# Patient Record
Sex: FEMALE | Race: WHITE | NOT HISPANIC OR LATINO | URBAN - METROPOLITAN AREA
[De-identification: names, ages, dates, MRNs, and addresses within clinical notes are randomized per-mention and may not be internally consistent; named-entity substitution may affect disease eponyms.]

---

## 2017-09-14 ENCOUNTER — EMERGENCY (EMERGENCY)
Facility: HOSPITAL | Age: 52
LOS: 1 days | Discharge: HOME | End: 2017-09-14
Attending: EMERGENCY MEDICINE | Admitting: EMERGENCY MEDICINE
Payer: COMMERCIAL

## 2017-09-14 VITALS
HEART RATE: 75 BPM | RESPIRATION RATE: 18 BRPM | TEMPERATURE: 98 F | SYSTOLIC BLOOD PRESSURE: 118 MMHG | DIASTOLIC BLOOD PRESSURE: 69 MMHG | OXYGEN SATURATION: 95 %

## 2017-09-14 VITALS
OXYGEN SATURATION: 98 % | TEMPERATURE: 98 F | DIASTOLIC BLOOD PRESSURE: 70 MMHG | RESPIRATION RATE: 18 BRPM | HEART RATE: 67 BPM | SYSTOLIC BLOOD PRESSURE: 109 MMHG

## 2017-09-14 DIAGNOSIS — Z98.891 HISTORY OF UTERINE SCAR FROM PREVIOUS SURGERY: Chronic | ICD-10-CM

## 2017-09-14 DIAGNOSIS — Z90.49 ACQUIRED ABSENCE OF OTHER SPECIFIED PARTS OF DIGESTIVE TRACT: Chronic | ICD-10-CM

## 2017-09-14 LAB
ALBUMIN SERPL ELPH-MCNC: 4.5 G/DL — SIGNIFICANT CHANGE UP (ref 3.3–5)
ALP SERPL-CCNC: 45 U/L — SIGNIFICANT CHANGE UP (ref 40–120)
ALT FLD-CCNC: 13 U/L RC — SIGNIFICANT CHANGE UP (ref 10–45)
ANION GAP SERPL CALC-SCNC: 15 MMOL/L — SIGNIFICANT CHANGE UP (ref 5–17)
AST SERPL-CCNC: 16 U/L — SIGNIFICANT CHANGE UP (ref 10–40)
BILIRUB SERPL-MCNC: 0.7 MG/DL — SIGNIFICANT CHANGE UP (ref 0.2–1.2)
BUN SERPL-MCNC: 14 MG/DL — SIGNIFICANT CHANGE UP (ref 7–23)
CALCIUM SERPL-MCNC: 9.6 MG/DL — SIGNIFICANT CHANGE UP (ref 8.4–10.5)
CHLORIDE SERPL-SCNC: 103 MMOL/L — SIGNIFICANT CHANGE UP (ref 96–108)
CO2 SERPL-SCNC: 24 MMOL/L — SIGNIFICANT CHANGE UP (ref 22–31)
CREAT SERPL-MCNC: 0.79 MG/DL — SIGNIFICANT CHANGE UP (ref 0.5–1.3)
GLUCOSE SERPL-MCNC: 98 MG/DL — SIGNIFICANT CHANGE UP (ref 70–99)
HCT VFR BLD CALC: 43.4 % — SIGNIFICANT CHANGE UP (ref 34.5–45)
HGB BLD-MCNC: 14.5 G/DL — SIGNIFICANT CHANGE UP (ref 11.5–15.5)
MCHC RBC-ENTMCNC: 30.7 PG — SIGNIFICANT CHANGE UP (ref 27–34)
MCHC RBC-ENTMCNC: 33.5 GM/DL — SIGNIFICANT CHANGE UP (ref 32–36)
MCV RBC AUTO: 91.5 FL — SIGNIFICANT CHANGE UP (ref 80–100)
PLATELET # BLD AUTO: 200 K/UL — SIGNIFICANT CHANGE UP (ref 150–400)
POTASSIUM SERPL-MCNC: 4 MMOL/L — SIGNIFICANT CHANGE UP (ref 3.5–5.3)
POTASSIUM SERPL-SCNC: 4 MMOL/L — SIGNIFICANT CHANGE UP (ref 3.5–5.3)
PROT SERPL-MCNC: 7.4 G/DL — SIGNIFICANT CHANGE UP (ref 6–8.3)
RBC # BLD: 4.74 M/UL — SIGNIFICANT CHANGE UP (ref 3.8–5.2)
RBC # FLD: 11.9 % — SIGNIFICANT CHANGE UP (ref 10.3–14.5)
SODIUM SERPL-SCNC: 142 MMOL/L — SIGNIFICANT CHANGE UP (ref 135–145)
WBC # BLD: 8.2 K/UL — SIGNIFICANT CHANGE UP (ref 3.8–10.5)
WBC # FLD AUTO: 8.2 K/UL — SIGNIFICANT CHANGE UP (ref 3.8–10.5)

## 2017-09-14 PROCEDURE — 99284 EMERGENCY DEPT VISIT MOD MDM: CPT

## 2017-09-14 RX ORDER — KETOROLAC TROMETHAMINE 30 MG/ML
15 SYRINGE (ML) INJECTION ONCE
Qty: 0 | Refills: 0 | Status: DISCONTINUED | OUTPATIENT
Start: 2017-09-14 | End: 2017-09-14

## 2017-09-14 RX ORDER — ACETAMINOPHEN 500 MG
1000 TABLET ORAL ONCE
Qty: 0 | Refills: 0 | Status: COMPLETED | OUTPATIENT
Start: 2017-09-14 | End: 2017-09-14

## 2017-09-14 RX ORDER — KETOROLAC TROMETHAMINE 30 MG/ML
30 SYRINGE (ML) INJECTION ONCE
Qty: 0 | Refills: 0 | Status: DISCONTINUED | OUTPATIENT
Start: 2017-09-14 | End: 2017-09-14

## 2017-09-14 RX ORDER — SODIUM CHLORIDE 9 MG/ML
1000 INJECTION INTRAMUSCULAR; INTRAVENOUS; SUBCUTANEOUS ONCE
Qty: 0 | Refills: 0 | Status: COMPLETED | OUTPATIENT
Start: 2017-09-14 | End: 2017-09-14

## 2017-09-14 RX ORDER — METOCLOPRAMIDE HCL 10 MG
10 TABLET ORAL ONCE
Qty: 0 | Refills: 0 | Status: COMPLETED | OUTPATIENT
Start: 2017-09-14 | End: 2017-09-14

## 2017-09-14 RX ADMIN — Medication 15 MILLIGRAM(S): at 22:34

## 2017-09-14 RX ADMIN — Medication 10 MILLIGRAM(S): at 22:34

## 2017-09-14 RX ADMIN — Medication 15 MILLIGRAM(S): at 23:27

## 2017-09-14 RX ADMIN — Medication 400 MILLIGRAM(S): at 23:31

## 2017-09-14 RX ADMIN — SODIUM CHLORIDE 1000 MILLILITER(S): 9 INJECTION INTRAMUSCULAR; INTRAVENOUS; SUBCUTANEOUS at 22:34

## 2017-09-14 NOTE — ED PROVIDER NOTE - OBJECTIVE STATEMENT
52 y.o. female hx of migraines p/w headache for 2 days. Patient states that this does feel similar to past migraines but is more severe. There is associated nausea and she has vomited 10-15 times. Takes elitriptan for migraines but this has not helped pain. Pain is mostly located on the left side of the head with associated photophobia and phonophobia. Denies vision changes, weakness.

## 2017-09-14 NOTE — ED PROVIDER NOTE - ATTENDING CONTRIBUTION TO CARE
52 y.o. female hx of migraines p/w headache started today l sided with multiple bouts of n/v. no uri sts, fevers. pt took her relapax without relief and rested all day without improvement. pt has had a prior mri brain which was nl. pt given migraine cocktail to reassess.

## 2017-09-14 NOTE — ED PROVIDER NOTE - CARE PLAN
Principal Discharge DX:	Migraine  Instructions for follow-up, activity and diet:	1. Continue all home medications as previously prescribed.   2. Follow up with your Primary Care Physician as soon as possible for further evaluation.   3. Return to the Emergency Department for any concerning symptoms.

## 2017-09-14 NOTE — ED PROVIDER NOTE - PLAN OF CARE
1. Continue all home medications as previously prescribed.   2. Follow up with your Primary Care Physician as soon as possible for further evaluation.   3. Return to the Emergency Department for any concerning symptoms.

## 2017-09-15 PROCEDURE — 99284 EMERGENCY DEPT VISIT MOD MDM: CPT | Mod: 25

## 2017-09-15 PROCEDURE — 96374 THER/PROPH/DIAG INJ IV PUSH: CPT

## 2017-09-15 PROCEDURE — 85027 COMPLETE CBC AUTOMATED: CPT

## 2017-09-15 PROCEDURE — 80053 COMPREHEN METABOLIC PANEL: CPT

## 2017-09-15 PROCEDURE — 96375 TX/PRO/DX INJ NEW DRUG ADDON: CPT

## 2017-09-15 RX ORDER — ONDANSETRON 8 MG/1
4 TABLET, FILM COATED ORAL ONCE
Qty: 0 | Refills: 0 | Status: COMPLETED | OUTPATIENT
Start: 2017-09-15 | End: 2017-09-15

## 2017-09-15 RX ADMIN — ONDANSETRON 4 MILLIGRAM(S): 8 TABLET, FILM COATED ORAL at 00:12

## 2017-09-15 RX ADMIN — Medication 1000 MILLIGRAM(S): at 00:11

## 2017-09-27 ENCOUNTER — EMERGENCY (EMERGENCY)
Facility: HOSPITAL | Age: 52
LOS: 1 days | Discharge: HOME | End: 2017-09-27
Attending: PERSONAL EMERGENCY RESPONSE ATTENDANT | Admitting: PERSONAL EMERGENCY RESPONSE ATTENDANT
Payer: COMMERCIAL

## 2017-09-27 VITALS
DIASTOLIC BLOOD PRESSURE: 74 MMHG | RESPIRATION RATE: 16 BRPM | WEIGHT: 156.09 LBS | TEMPERATURE: 37 F | OXYGEN SATURATION: 96 % | SYSTOLIC BLOOD PRESSURE: 111 MMHG | HEART RATE: 87 BPM

## 2017-09-27 DIAGNOSIS — Z90.49 ACQUIRED ABSENCE OF OTHER SPECIFIED PARTS OF DIGESTIVE TRACT: Chronic | ICD-10-CM

## 2017-09-27 DIAGNOSIS — Z98.891 HISTORY OF UTERINE SCAR FROM PREVIOUS SURGERY: Chronic | ICD-10-CM

## 2017-09-27 LAB
ALBUMIN SERPL ELPH-MCNC: 4.2 G/DL — SIGNIFICANT CHANGE UP (ref 3.3–5)
ALP SERPL-CCNC: 42 U/L — SIGNIFICANT CHANGE UP (ref 40–120)
ALT FLD-CCNC: 13 U/L RC — SIGNIFICANT CHANGE UP (ref 10–45)
ANION GAP SERPL CALC-SCNC: 11 MMOL/L — SIGNIFICANT CHANGE UP (ref 5–17)
APPEARANCE UR: CLEAR — SIGNIFICANT CHANGE UP
APTT BLD: 30.8 SEC — SIGNIFICANT CHANGE UP (ref 27.5–37.4)
AST SERPL-CCNC: 15 U/L — SIGNIFICANT CHANGE UP (ref 10–40)
BILIRUB SERPL-MCNC: 0.5 MG/DL — SIGNIFICANT CHANGE UP (ref 0.2–1.2)
BILIRUB UR-MCNC: NEGATIVE — SIGNIFICANT CHANGE UP
BUN SERPL-MCNC: 11 MG/DL — SIGNIFICANT CHANGE UP (ref 7–23)
CALCIUM SERPL-MCNC: 9.3 MG/DL — SIGNIFICANT CHANGE UP (ref 8.4–10.5)
CHLORIDE SERPL-SCNC: 104 MMOL/L — SIGNIFICANT CHANGE UP (ref 96–108)
CO2 SERPL-SCNC: 27 MMOL/L — SIGNIFICANT CHANGE UP (ref 22–31)
COLOR SPEC: COLORLESS — SIGNIFICANT CHANGE UP
CREAT SERPL-MCNC: 0.83 MG/DL — SIGNIFICANT CHANGE UP (ref 0.5–1.3)
DIFF PNL FLD: NEGATIVE — SIGNIFICANT CHANGE UP
GLUCOSE SERPL-MCNC: 96 MG/DL — SIGNIFICANT CHANGE UP (ref 70–99)
GLUCOSE UR QL: NEGATIVE — SIGNIFICANT CHANGE UP
HCT VFR BLD CALC: 41.9 % — SIGNIFICANT CHANGE UP (ref 34.5–45)
HGB BLD-MCNC: 14.1 G/DL — SIGNIFICANT CHANGE UP (ref 11.5–15.5)
INR BLD: 1.25 RATIO — HIGH (ref 0.88–1.16)
KETONES UR-MCNC: NEGATIVE — SIGNIFICANT CHANGE UP
LEUKOCYTE ESTERASE UR-ACNC: NEGATIVE — SIGNIFICANT CHANGE UP
LIDOCAIN IGE QN: 29 U/L — SIGNIFICANT CHANGE UP (ref 7–60)
MCHC RBC-ENTMCNC: 31 PG — SIGNIFICANT CHANGE UP (ref 27–34)
MCHC RBC-ENTMCNC: 33.7 GM/DL — SIGNIFICANT CHANGE UP (ref 32–36)
MCV RBC AUTO: 91.8 FL — SIGNIFICANT CHANGE UP (ref 80–100)
NITRITE UR-MCNC: NEGATIVE — SIGNIFICANT CHANGE UP
PH UR: 6.5 — SIGNIFICANT CHANGE UP (ref 5–8)
PLATELET # BLD AUTO: 140 K/UL — LOW (ref 150–400)
POTASSIUM SERPL-MCNC: 4.1 MMOL/L — SIGNIFICANT CHANGE UP (ref 3.5–5.3)
POTASSIUM SERPL-SCNC: 4.1 MMOL/L — SIGNIFICANT CHANGE UP (ref 3.5–5.3)
PROT SERPL-MCNC: 7.1 G/DL — SIGNIFICANT CHANGE UP (ref 6–8.3)
PROT UR-MCNC: NEGATIVE — SIGNIFICANT CHANGE UP
PROTHROM AB SERPL-ACNC: 13.6 SEC — HIGH (ref 9.8–12.7)
RBC # BLD: 4.56 M/UL — SIGNIFICANT CHANGE UP (ref 3.8–5.2)
RBC # FLD: 11.8 % — SIGNIFICANT CHANGE UP (ref 10.3–14.5)
SODIUM SERPL-SCNC: 142 MMOL/L — SIGNIFICANT CHANGE UP (ref 135–145)
SP GR SPEC: 1.01 — LOW (ref 1.01–1.02)
UROBILINOGEN FLD QL: NEGATIVE — SIGNIFICANT CHANGE UP
WBC # BLD: 6 K/UL — SIGNIFICANT CHANGE UP (ref 3.8–10.5)
WBC # FLD AUTO: 6 K/UL — SIGNIFICANT CHANGE UP (ref 3.8–10.5)

## 2017-09-27 PROCEDURE — 99285 EMERGENCY DEPT VISIT HI MDM: CPT

## 2017-09-27 RX ORDER — POLYETHYLENE GLYCOL 3350 17 G/17G
0 POWDER, FOR SOLUTION ORAL
Qty: 0 | Refills: 0 | COMMUNITY

## 2017-09-27 RX ORDER — ACETAMINOPHEN 500 MG
1000 TABLET ORAL ONCE
Qty: 0 | Refills: 0 | Status: COMPLETED | OUTPATIENT
Start: 2017-09-27 | End: 2017-09-27

## 2017-09-27 RX ORDER — CETIRIZINE HYDROCHLORIDE 10 MG/1
0 TABLET ORAL
Qty: 0 | Refills: 0 | COMMUNITY

## 2017-09-27 RX ORDER — ELETRIPTAN HYDROBROMIDE 20 MG/1
0 TABLET, FILM COATED ORAL
Qty: 0 | Refills: 0 | COMMUNITY

## 2017-09-27 RX ADMIN — Medication 400 MILLIGRAM(S): at 23:28

## 2017-09-27 NOTE — ED ADULT NURSE NOTE - OBJECTIVE STATEMENT
Received patient awake and alert x 4, Presents with abdominal pain, pain which started on 12AM last night. Per patient, pain was constant at first and now it comes and goes with painful spasms. Denies nausea/vomiting. Last bowel movement was this morning. Patient was seen at [facility] where CT of abdomen was preformed and showed small bowel Intussusception. Patient denies any fever/chills. Breathing unlabored with no S/S of distress noted, MD at bedside to further evaluate, family at bedside, safety maintained, will continue to monitor.

## 2017-09-27 NOTE — ED PROVIDER NOTE - ATTENDING CONTRIBUTION TO CARE
Attending MD Titus.  Agree with above.  Pt is a 52 yr old female with hx of migraines only who awoke last night 2/2 severe abdominal pain causing nausea but not vomiting.  Pain worse with lying flat and better when she curls into a ball.  Pt sent for CT abd/pelvis by PCP as outpt.  Pt found to have 'intussuception'.  Pt refusing pain meds on arrival but remains in persistent 5/10 pain.  Outpt images uploaded and surgery consulted on arrival.  Hx appendectomy remotely, 3 c-sections.  No known colon CA risk factors.  Mild free fluid in pelvis. Pt tender on exam concerning for persistent intussuception.  Labs non-actionable.  Surgery consulted and consult pending at time of signout to incoming team.

## 2017-09-27 NOTE — ED PROVIDER NOTE - OBJECTIVE STATEMENT
53 y/o F pmhx migraines, sgx h/o appendectomy remotely and 3 c/s, presenting sent in by primary care physician after outpatient ct scan for abdominal pain. Pt reports severe abdominal pain sharp 10/10 woke her from sleep last night causing nausea. Pt reports pain is worse when she lays flat, gets better when she curls up in a ball. States this morning feeling significantly nauseated but denies any vomiting. Reports she went to the partner of her PMD this morning who sent her to [facility] imaging facility for CT a/p. States she was on her way home from the ct scan and received a call from the doctor telling her to go to the ER as the ct shows intussusception. She reports abdominal pain is 6/10 at this time but reports she does not want anything for pain at this time. Denies any nausea. Reports normal bowel movements.

## 2017-09-27 NOTE — ED PROVIDER NOTE - MEDICAL DECISION MAKING DETAILS
51 y/o F pmhx migraines p/w abdominal pain worse when laying flat better when curled in a ball s/p outpatient ct a/p positive for intussusception sent in by pmd. pe remarkable for mild abdominal tenderness periumbilical. will obtain preop labs, pain control prn, surgery c/s and reassess.

## 2017-09-27 NOTE — ED PROVIDER NOTE - PROGRESS NOTE DETAILS
pt seen by surgery and repeat ct normal, ok for d.c home. tolerating po for f/u with pmd.   anastasiia johnson

## 2017-09-28 VITALS
SYSTOLIC BLOOD PRESSURE: 105 MMHG | HEART RATE: 81 BPM | DIASTOLIC BLOOD PRESSURE: 58 MMHG | RESPIRATION RATE: 18 BRPM | OXYGEN SATURATION: 99 %

## 2017-09-28 LAB
CULTURE RESULTS: NO GROWTH — SIGNIFICANT CHANGE UP
SPECIMEN SOURCE: SIGNIFICANT CHANGE UP

## 2017-09-28 PROCEDURE — 80053 COMPREHEN METABOLIC PANEL: CPT

## 2017-09-28 PROCEDURE — 83605 ASSAY OF LACTIC ACID: CPT

## 2017-09-28 PROCEDURE — 96374 THER/PROPH/DIAG INJ IV PUSH: CPT | Mod: XU

## 2017-09-28 PROCEDURE — 85730 THROMBOPLASTIN TIME PARTIAL: CPT

## 2017-09-28 PROCEDURE — 74177 CT ABD & PELVIS W/CONTRAST: CPT

## 2017-09-28 PROCEDURE — 99284 EMERGENCY DEPT VISIT MOD MDM: CPT | Mod: 25

## 2017-09-28 PROCEDURE — 83690 ASSAY OF LIPASE: CPT

## 2017-09-28 PROCEDURE — 74177 CT ABD & PELVIS W/CONTRAST: CPT | Mod: 26

## 2017-09-28 PROCEDURE — 85610 PROTHROMBIN TIME: CPT

## 2017-09-28 PROCEDURE — 85027 COMPLETE CBC AUTOMATED: CPT

## 2017-09-28 PROCEDURE — 81003 URINALYSIS AUTO W/O SCOPE: CPT

## 2017-09-28 PROCEDURE — 87086 URINE CULTURE/COLONY COUNT: CPT

## 2017-09-28 NOTE — CONSULT NOTE ADULT - SUBJECTIVE AND OBJECTIVE BOX
CC: Patient is a 52y old  Female who presents with a chief complaint of abdominal pain.    HPI: Patient is a 52y year old female with PMHx of migraines, sgx h/o appendectomy remotely and 3 c/s, presenting sent in by primary care physician after outpatient ct scan for abdominal pain. Pt reports severe abdominal pain sharp 10/10 woke her from sleep last night causing nausea. Pt reports pain is worse when she lays flat, gets better when she curls up in a ball. States this morning feeling significantly nauseated but denies any vomiting. Reports she went to the partner of her PMD this morning who sent her to [facility] imaging facility for CT a/p. States she was on her way home from the ct scan and received a call from the doctor telling her to go to the ER as the ct shows intussusception. She reports abdominal pain is 6/10 at this time but reports she does not want anything for pain at this time. Denies any nausea. Reports normal bowel movements.    PMH  Migraine  No pertinent past medical history    PSH  H/O:   History of appendectomy    MEDS        MiraLax: Last Dose Taken:     · 	ZyrTEC: Last Dose Taken:             ·    Relpax: Last Dose Taken:    Allergies    No Known Allergies    Social: Non smoker, Non drinker.      Physical Exam  T(C): 37.2 (17 @ 02:32), Max: 37.2 (17 @ 02:32)  HR: 81 (17 @ 06:30) (77 - 87)  BP: 105/58 (17 @ 06:30) (100/57 - 119/61)  RR: 18 (17 @ 06:30) (16 - 20)  SpO2: 99% (17 @ 06:30) (96% - 100%)  Tmax: T(C): , Max: 37.2 (17 @ 02:32)    Gen: NAD, alert and interactive, oriented.  HEENT: normocephalic, atraumatic, no scleral icterus  CV: S1, S2, RRR  Pulm: CTA B/L  Abd: Soft, ND, mild periumbilical tenderness, no rebound, no guarding, no palpable organomegaly/masses  Ext: warm, no edema, palp dp/pt    Labs:                        14.1   6.0   )-----------( 140      ( 27 Sep 2017 20:48 )             41.9         142  |  104  |  11  ----------------------------<  96  4.1   |  27  |  0.83    Ca    9.3      27 Sep 2017 20:48    TPro  7.1  /  Alb  4.2  /  TBili  0.5  /  DBili  x   /  AST  15  /  ALT  13  /  AlkPhos  42      PT/INR - ( 27 Sep 2017 20:48 )   PT: 13.6 sec;   INR: 1.25 ratio         PTT - ( 27 Sep 2017 20:48 )  PTT:30.8 sec  Urinalysis Basic - ( 27 Sep 2017 20:48 )    Color: x / Appearance: Clear / S.009 / pH: x  Gluc: x / Ketone: Negative  / Bili: Negative / Urobili: Negative   Blood: x / Protein: Negative / Nitrite: Negative   Leuk Esterase: Negative / RBC: x / WBC x   Sq Epi: x / Non Sq Epi: x / Bacteria: x    Imaging    CT IMPRESSION:     Mild edema of the small bowel mesentery and small amount of pelvic free   fluid, nonspecific. This may be related to sequela of recent   intussusception, but outside imaging studies not available for direct   comparison. No intussusception identified on the current exam.

## 2017-09-28 NOTE — CONSULT NOTE ADULT - ASSESSMENT
52y year old Female who presents with abdominal pain, OSH CT showed jejunum interssusception about 3 cm in length.   - Patient has much less pain now  - Repeat CT scan showed disappearance of intussusception,  - Patient is able to tolerate oral intake  - would recommend GI follow up to r/o possibility of tumor.   - Patient is voluntarily discharged, all questions answered, patient agreed with the plan.  - d/w Dr. Hanson.

## 2018-01-19 ENCOUNTER — OUTPATIENT (OUTPATIENT)
Dept: OUTPATIENT SERVICES | Facility: HOSPITAL | Age: 53
LOS: 1 days | End: 2018-01-19

## 2018-01-19 ENCOUNTER — APPOINTMENT (OUTPATIENT)
Dept: NUCLEAR MEDICINE | Facility: HOSPITAL | Age: 53
End: 2018-01-19
Payer: COMMERCIAL

## 2018-01-19 DIAGNOSIS — R68.81 EARLY SATIETY: ICD-10-CM

## 2018-01-19 DIAGNOSIS — Z90.49 ACQUIRED ABSENCE OF OTHER SPECIFIED PARTS OF DIGESTIVE TRACT: Chronic | ICD-10-CM

## 2018-01-19 DIAGNOSIS — Z98.891 HISTORY OF UTERINE SCAR FROM PREVIOUS SURGERY: Chronic | ICD-10-CM

## 2018-01-19 PROCEDURE — 78264 GASTRIC EMPTYING IMG STUDY: CPT | Mod: 26

## 2019-12-08 ENCOUNTER — EMERGENCY (EMERGENCY)
Facility: HOSPITAL | Age: 54
LOS: 1 days | Discharge: HOME | End: 2019-12-08
Attending: STUDENT IN AN ORGANIZED HEALTH CARE EDUCATION/TRAINING PROGRAM
Payer: COMMERCIAL

## 2019-12-08 VITALS
OXYGEN SATURATION: 100 % | SYSTOLIC BLOOD PRESSURE: 120 MMHG | RESPIRATION RATE: 18 BRPM | TEMPERATURE: 99 F | DIASTOLIC BLOOD PRESSURE: 68 MMHG | HEART RATE: 67 BPM

## 2019-12-08 VITALS
WEIGHT: 149.03 LBS | HEART RATE: 69 BPM | SYSTOLIC BLOOD PRESSURE: 115 MMHG | RESPIRATION RATE: 17 BRPM | DIASTOLIC BLOOD PRESSURE: 76 MMHG | OXYGEN SATURATION: 98 % | TEMPERATURE: 98 F | HEIGHT: 68 IN

## 2019-12-08 DIAGNOSIS — Z98.891 HISTORY OF UTERINE SCAR FROM PREVIOUS SURGERY: Chronic | ICD-10-CM

## 2019-12-08 DIAGNOSIS — Z90.49 ACQUIRED ABSENCE OF OTHER SPECIFIED PARTS OF DIGESTIVE TRACT: Chronic | ICD-10-CM

## 2019-12-08 LAB
ALBUMIN SERPL ELPH-MCNC: 4.2 G/DL — SIGNIFICANT CHANGE UP (ref 3.3–5)
ALP SERPL-CCNC: 63 U/L — SIGNIFICANT CHANGE UP (ref 40–120)
ALT FLD-CCNC: 15 U/L — SIGNIFICANT CHANGE UP (ref 10–45)
ANION GAP SERPL CALC-SCNC: 11 MMOL/L — SIGNIFICANT CHANGE UP (ref 5–17)
AST SERPL-CCNC: 13 U/L — SIGNIFICANT CHANGE UP (ref 10–40)
BASOPHILS # BLD AUTO: 0.03 K/UL — SIGNIFICANT CHANGE UP (ref 0–0.2)
BASOPHILS NFR BLD AUTO: 0.5 % — SIGNIFICANT CHANGE UP (ref 0–2)
BILIRUB SERPL-MCNC: 0.2 MG/DL — SIGNIFICANT CHANGE UP (ref 0.2–1.2)
BUN SERPL-MCNC: 18 MG/DL — SIGNIFICANT CHANGE UP (ref 7–23)
CALCIUM SERPL-MCNC: 9.6 MG/DL — SIGNIFICANT CHANGE UP (ref 8.4–10.5)
CHLORIDE SERPL-SCNC: 105 MMOL/L — SIGNIFICANT CHANGE UP (ref 96–108)
CO2 SERPL-SCNC: 26 MMOL/L — SIGNIFICANT CHANGE UP (ref 22–31)
CREAT SERPL-MCNC: 0.79 MG/DL — SIGNIFICANT CHANGE UP (ref 0.5–1.3)
EOSINOPHIL # BLD AUTO: 0.08 K/UL — SIGNIFICANT CHANGE UP (ref 0–0.5)
EOSINOPHIL NFR BLD AUTO: 1.4 % — SIGNIFICANT CHANGE UP (ref 0–6)
GLUCOSE SERPL-MCNC: 98 MG/DL — SIGNIFICANT CHANGE UP (ref 70–99)
HCT VFR BLD CALC: 39.6 % — SIGNIFICANT CHANGE UP (ref 34.5–45)
HGB BLD-MCNC: 13.4 G/DL — SIGNIFICANT CHANGE UP (ref 11.5–15.5)
IMM GRANULOCYTES NFR BLD AUTO: 0.2 % — SIGNIFICANT CHANGE UP (ref 0–1.5)
LYMPHOCYTES # BLD AUTO: 1.76 K/UL — SIGNIFICANT CHANGE UP (ref 1–3.3)
LYMPHOCYTES # BLD AUTO: 30.9 % — SIGNIFICANT CHANGE UP (ref 13–44)
MAGNESIUM SERPL-MCNC: 2 MG/DL — SIGNIFICANT CHANGE UP (ref 1.6–2.6)
MCHC RBC-ENTMCNC: 29.7 PG — SIGNIFICANT CHANGE UP (ref 27–34)
MCHC RBC-ENTMCNC: 33.8 GM/DL — SIGNIFICANT CHANGE UP (ref 32–36)
MCV RBC AUTO: 87.8 FL — SIGNIFICANT CHANGE UP (ref 80–100)
MONOCYTES # BLD AUTO: 0.49 K/UL — SIGNIFICANT CHANGE UP (ref 0–0.9)
MONOCYTES NFR BLD AUTO: 8.6 % — SIGNIFICANT CHANGE UP (ref 2–14)
NEUTROPHILS # BLD AUTO: 3.33 K/UL — SIGNIFICANT CHANGE UP (ref 1.8–7.4)
NEUTROPHILS NFR BLD AUTO: 58.4 % — SIGNIFICANT CHANGE UP (ref 43–77)
NRBC # BLD: 0 /100 WBCS — SIGNIFICANT CHANGE UP (ref 0–0)
PHOSPHATE SERPL-MCNC: 3.5 MG/DL — SIGNIFICANT CHANGE UP (ref 2.5–4.5)
PLATELET # BLD AUTO: 185 K/UL — SIGNIFICANT CHANGE UP (ref 150–400)
POTASSIUM SERPL-MCNC: 3.7 MMOL/L — SIGNIFICANT CHANGE UP (ref 3.5–5.3)
POTASSIUM SERPL-SCNC: 3.7 MMOL/L — SIGNIFICANT CHANGE UP (ref 3.5–5.3)
PROT SERPL-MCNC: 7.1 G/DL — SIGNIFICANT CHANGE UP (ref 6–8.3)
RBC # BLD: 4.51 M/UL — SIGNIFICANT CHANGE UP (ref 3.8–5.2)
RBC # FLD: 12.8 % — SIGNIFICANT CHANGE UP (ref 10.3–14.5)
SODIUM SERPL-SCNC: 142 MMOL/L — SIGNIFICANT CHANGE UP (ref 135–145)
WBC # BLD: 5.7 K/UL — SIGNIFICANT CHANGE UP (ref 3.8–10.5)
WBC # FLD AUTO: 5.7 K/UL — SIGNIFICANT CHANGE UP (ref 3.8–10.5)

## 2019-12-08 PROCEDURE — 80053 COMPREHEN METABOLIC PANEL: CPT

## 2019-12-08 PROCEDURE — 84100 ASSAY OF PHOSPHORUS: CPT

## 2019-12-08 PROCEDURE — 93005 ELECTROCARDIOGRAM TRACING: CPT

## 2019-12-08 PROCEDURE — 83735 ASSAY OF MAGNESIUM: CPT

## 2019-12-08 PROCEDURE — 99284 EMERGENCY DEPT VISIT MOD MDM: CPT

## 2019-12-08 PROCEDURE — 84484 ASSAY OF TROPONIN QUANT: CPT

## 2019-12-08 PROCEDURE — 85027 COMPLETE CBC AUTOMATED: CPT

## 2019-12-08 PROCEDURE — 99284 EMERGENCY DEPT VISIT MOD MDM: CPT | Mod: 25

## 2019-12-08 PROCEDURE — 93010 ELECTROCARDIOGRAM REPORT: CPT

## 2019-12-08 RX ORDER — SODIUM CHLORIDE 9 MG/ML
1000 INJECTION INTRAMUSCULAR; INTRAVENOUS; SUBCUTANEOUS ONCE
Refills: 0 | Status: COMPLETED | OUTPATIENT
Start: 2019-12-08 | End: 2019-12-08

## 2019-12-08 RX ADMIN — SODIUM CHLORIDE 1000 MILLILITER(S): 9 INJECTION INTRAMUSCULAR; INTRAVENOUS; SUBCUTANEOUS at 21:29

## 2019-12-08 NOTE — ED PROVIDER NOTE - ATTENDING CONTRIBUTION TO CARE
54 F w/ no significant pmh presents to the ED w/ palpitations that occurred at 630 pm. Pt states that it lasted for approx 2 hours and self resolved, currently asymptomatic,   Pt has no cp, no sob. she reports that she kept walking around because she thought 54 F w/ no significant pmh presents to the ED w/ palpitations that occurred at 630 pm. Pt states that it lasted for approx 2 hours and self resolved, currently asymptomatic,   Pt has no cp, no sob. she reports that she kept walking around because she she felt better  unclear etiology ekg nsr, pt asymptomatic   will d/c w/ pcp follow up.

## 2019-12-08 NOTE — ED PROVIDER NOTE - NSFOLLOWUPCLINICS_GEN_ALL_ED_FT
[facility]  Cardiology  [address] [place] [id number]  Phone: [phone]  Fax:   Follow Up Time:

## 2019-12-08 NOTE — ED PROVIDER NOTE - PROGRESS NOTE DETAILS
Labs wnl. Discussed EKG findings with pt. Gave cardiology follow up, and discussed close follow up with PMD.   Arnulfo Roland M.D. PGY-2

## 2019-12-08 NOTE — ED PROVIDER NOTE - PATIENT PORTAL LINK FT
You can access the FollowMyHealth Patient Portal offered by [facility] by registering at the following website: [email or web address]. By joining [employer]’s FollowMyHealth portal, you will also be able to view your health information using other applications (apps) compatible with our system.

## 2019-12-08 NOTE — ED PROVIDER NOTE - CLINICAL SUMMARY MEDICAL DECISION MAKING FREE TEXT BOX
54F presents with palpitations and feeling off. May have had episode of SVT which resolved before arrival. EKG shows sinus earl and incomplete RBBB. Pt is well appearing. Will place on cardiac monitor, repeat EKG if symptomatic. Likely d/c home with cards f/u

## 2019-12-08 NOTE — ED PROVIDER NOTE - OBJECTIVE STATEMENT
54F PMH migraines p/w palpitations. Pt reports since waking up she has been feeling off. States she had a migraine this AM so she took her medication which did not work as fast as usual, but did eventually relieve her headache. She then states that throughout the day she had this persistent feeling of feeling off. Was taking naps throughout the day. At around 6pm she woke up from a nap and had a sensation that her heart was racing and she felt very off and lightheaded. This episode last for a few minutes and then resolved. Called EMS. No CP/SOB, no f/c. No unilateral leg swelling, No recent surgery, No prior PE or DVT, No oral hormone use.

## 2019-12-08 NOTE — ED PROVIDER NOTE - NS ED ROS FT
REVIEW OF SYSTEMS:  General: +general malaise. no fever, no chills  HEENT: no headache, no vision changes  Cardiac: no chest pain, +palpitations  Respiratory: no cough, no shortness of breath  Gastrointestinal: no abdominal pain, no nausea, no vomiting, no diarrhea  Genitourinary: no hematuria, no dysuria, no urinary frequency  Extremities: no extremity swelling, no extremity pain  Neuro: no focal weakness, no numbness/tingling of the extremities, no decreased sensation  Heme: no easy bleeding, no easy bruising  Skin: no jaundice,  no rashes, no lesions  All other ROS as documented in HPI  -Arnulfo Roland, PGY-2

## 2019-12-08 NOTE — ED PROVIDER NOTE - PHYSICAL EXAMINATION
General: Well developed, well nourished  HEENT: Normocephalic and atraumatic, Trachea midline.   Cardiac: Normal S1 and S2 w/ RRR. No MRG.  Pulmonary: CTA bilaterally. No increased WOB.   Abdominal: Soft, NTND  Neurologic: AAOx3, Moving all extremities. Gait normal.   Musculoskeletal: No limited ROM.  Vascular: Warm and well perfused  Skin: Color appropriate for race.   Psychiatric: Appropriate mood and affect. No apparent risk to self or others.  Arnulfo Roland M.D. PGY-2

## 2019-12-08 NOTE — ED ADULT TRIAGE NOTE - CCCP TRG CHIEF CMPLNT
Discussed with . Patient can follow up and discontinue medication at her discretion. Will notify patient's PCP that she has declined to continue treatment.    dizziness

## 2019-12-08 NOTE — ED ADULT NURSE NOTE - OBJECTIVE STATEMENT
Reports waking up dizzy and lightheaded, "heart was racing" at 6:30. Patient is a 54y female presenting to the ED via EMS from home with c/o dizziness. Patient A&Ox4. Reports being woken up from sleep by palpitations. States she woke up with her "heart racing" accompanied by dizziness and lightheadedness at 6:30 pm. States she had a "heavy" feeling in her chest, and instructed her  to call an ambulance. Denies nausea, SOB, associated with the symptoms. Upon arrival to [facility] ED, patient denies any chest pain, SOB or palpitations. ECG performed at bedside showing heart rate in the 60s, NSR. Heart sounds heard upon auscultation. Lung sounds clear bilaterally. Abdomen soft, non-distended and non-tender upon palpation. PMH of migraine headaches. Denies headache, vomiting or diarrhea, abdominal pain, fever/chills, burning upon urination or difficulty urinating, hematuria. Plan of care discussed. Safety and comfort measures maintained. Will continue to monitor.

## 2019-12-09 PROBLEM — G43.909 MIGRAINE, UNSPECIFIED, NOT INTRACTABLE, WITHOUT STATUS MIGRAINOSUS: Chronic | Status: ACTIVE | Noted: 2017-09-14

## 2020-08-13 ENCOUNTER — OUTPATIENT (OUTPATIENT)
Dept: OUTPATIENT SERVICES | Facility: HOSPITAL | Age: 55
LOS: 1 days | End: 2020-08-13
Payer: COMMERCIAL

## 2020-08-13 ENCOUNTER — APPOINTMENT (OUTPATIENT)
Dept: MAMMOGRAPHY | Facility: IMAGING CENTER | Age: 55
End: 2020-08-13
Payer: COMMERCIAL

## 2020-08-13 ENCOUNTER — APPOINTMENT (OUTPATIENT)
Dept: ULTRASOUND IMAGING | Facility: IMAGING CENTER | Age: 55
End: 2020-08-13
Payer: COMMERCIAL

## 2020-08-13 DIAGNOSIS — Z00.8 ENCOUNTER FOR OTHER GENERAL EXAMINATION: ICD-10-CM

## 2020-08-13 DIAGNOSIS — Z98.891 HISTORY OF UTERINE SCAR FROM PREVIOUS SURGERY: Chronic | ICD-10-CM

## 2020-08-13 DIAGNOSIS — Z90.49 ACQUIRED ABSENCE OF OTHER SPECIFIED PARTS OF DIGESTIVE TRACT: Chronic | ICD-10-CM

## 2020-08-13 PROCEDURE — 76641 ULTRASOUND BREAST COMPLETE: CPT

## 2020-08-13 PROCEDURE — 77067 SCR MAMMO BI INCL CAD: CPT | Mod: 26

## 2020-08-13 PROCEDURE — 77063 BREAST TOMOSYNTHESIS BI: CPT | Mod: 26

## 2020-08-13 PROCEDURE — 77063 BREAST TOMOSYNTHESIS BI: CPT

## 2020-08-13 PROCEDURE — 76641 ULTRASOUND BREAST COMPLETE: CPT | Mod: 26,50

## 2020-08-13 PROCEDURE — 77067 SCR MAMMO BI INCL CAD: CPT

## 2021-05-26 ENCOUNTER — OUTPATIENT (OUTPATIENT)
Dept: OUTPATIENT SERVICES | Facility: HOSPITAL | Age: 56
LOS: 1 days | End: 2021-05-26
Payer: COMMERCIAL

## 2021-05-26 ENCOUNTER — APPOINTMENT (OUTPATIENT)
Dept: CT IMAGING | Facility: IMAGING CENTER | Age: 56
End: 2021-05-26
Payer: COMMERCIAL

## 2021-05-26 DIAGNOSIS — Z98.891 HISTORY OF UTERINE SCAR FROM PREVIOUS SURGERY: Chronic | ICD-10-CM

## 2021-05-26 DIAGNOSIS — Z90.49 ACQUIRED ABSENCE OF OTHER SPECIFIED PARTS OF DIGESTIVE TRACT: Chronic | ICD-10-CM

## 2021-05-26 DIAGNOSIS — Z00.8 ENCOUNTER FOR OTHER GENERAL EXAMINATION: ICD-10-CM

## 2021-05-26 PROCEDURE — 74178 CT ABD&PLV WO CNTR FLWD CNTR: CPT

## 2021-05-26 PROCEDURE — 82565 ASSAY OF CREATININE: CPT

## 2021-05-26 PROCEDURE — 74178 CT ABD&PLV WO CNTR FLWD CNTR: CPT | Mod: 26

## 2022-05-11 ENCOUNTER — APPOINTMENT (OUTPATIENT)
Dept: MAMMOGRAPHY | Facility: IMAGING CENTER | Age: 57
End: 2022-05-11

## 2022-05-11 ENCOUNTER — APPOINTMENT (OUTPATIENT)
Dept: ULTRASOUND IMAGING | Facility: IMAGING CENTER | Age: 57
End: 2022-05-11

## 2023-04-28 ENCOUNTER — OUTPATIENT (OUTPATIENT)
Dept: OUTPATIENT SERVICES | Facility: HOSPITAL | Age: 58
LOS: 1 days | End: 2023-04-28
Payer: COMMERCIAL

## 2023-04-28 ENCOUNTER — APPOINTMENT (OUTPATIENT)
Dept: MAMMOGRAPHY | Facility: IMAGING CENTER | Age: 58
End: 2023-04-28
Payer: COMMERCIAL

## 2023-04-28 ENCOUNTER — APPOINTMENT (OUTPATIENT)
Dept: ULTRASOUND IMAGING | Facility: IMAGING CENTER | Age: 58
End: 2023-04-28
Payer: COMMERCIAL

## 2023-04-28 DIAGNOSIS — Z00.8 ENCOUNTER FOR OTHER GENERAL EXAMINATION: ICD-10-CM

## 2023-04-28 DIAGNOSIS — Z90.49 ACQUIRED ABSENCE OF OTHER SPECIFIED PARTS OF DIGESTIVE TRACT: Chronic | ICD-10-CM

## 2023-04-28 DIAGNOSIS — Z98.891 HISTORY OF UTERINE SCAR FROM PREVIOUS SURGERY: Chronic | ICD-10-CM

## 2023-04-28 PROCEDURE — 77067 SCR MAMMO BI INCL CAD: CPT | Mod: 26

## 2023-04-28 PROCEDURE — 77063 BREAST TOMOSYNTHESIS BI: CPT | Mod: 26

## 2023-04-28 PROCEDURE — 76641 ULTRASOUND BREAST COMPLETE: CPT | Mod: 26,50

## 2023-04-28 PROCEDURE — 77063 BREAST TOMOSYNTHESIS BI: CPT

## 2023-04-28 PROCEDURE — 76641 ULTRASOUND BREAST COMPLETE: CPT

## 2023-04-28 PROCEDURE — 77067 SCR MAMMO BI INCL CAD: CPT

## 2023-05-06 ENCOUNTER — APPOINTMENT (OUTPATIENT)
Dept: CT IMAGING | Facility: CLINIC | Age: 58
End: 2023-05-06
Payer: COMMERCIAL

## 2023-05-06 ENCOUNTER — OUTPATIENT (OUTPATIENT)
Dept: OUTPATIENT SERVICES | Facility: HOSPITAL | Age: 58
LOS: 1 days | End: 2023-05-06
Payer: COMMERCIAL

## 2023-05-06 DIAGNOSIS — Z98.891 HISTORY OF UTERINE SCAR FROM PREVIOUS SURGERY: Chronic | ICD-10-CM

## 2023-05-06 DIAGNOSIS — Z90.49 ACQUIRED ABSENCE OF OTHER SPECIFIED PARTS OF DIGESTIVE TRACT: Chronic | ICD-10-CM

## 2023-05-06 DIAGNOSIS — R10.9 UNSPECIFIED ABDOMINAL PAIN: ICD-10-CM

## 2023-05-06 PROCEDURE — 74177 CT ABD & PELVIS W/CONTRAST: CPT | Mod: 26

## 2023-05-06 PROCEDURE — 74177 CT ABD & PELVIS W/CONTRAST: CPT

## 2023-05-30 ENCOUNTER — APPOINTMENT (OUTPATIENT)
Dept: MRI IMAGING | Facility: CLINIC | Age: 58
End: 2023-05-30

## 2024-08-09 ENCOUNTER — OUTPATIENT (OUTPATIENT)
Dept: OUTPATIENT SERVICES | Facility: HOSPITAL | Age: 59
LOS: 1 days | End: 2024-08-09
Payer: COMMERCIAL

## 2024-08-09 VITALS
OXYGEN SATURATION: 98 % | TEMPERATURE: 98 F | RESPIRATION RATE: 18 BRPM | WEIGHT: 167.99 LBS | HEART RATE: 58 BPM | HEIGHT: 68 IN | DIASTOLIC BLOOD PRESSURE: 64 MMHG | SYSTOLIC BLOOD PRESSURE: 102 MMHG

## 2024-08-09 DIAGNOSIS — Z90.49 ACQUIRED ABSENCE OF OTHER SPECIFIED PARTS OF DIGESTIVE TRACT: Chronic | ICD-10-CM

## 2024-08-09 DIAGNOSIS — K64.3 FOURTH DEGREE HEMORRHOIDS: ICD-10-CM

## 2024-08-09 DIAGNOSIS — Z98.891 HISTORY OF UTERINE SCAR FROM PREVIOUS SURGERY: Chronic | ICD-10-CM

## 2024-08-09 DIAGNOSIS — Z98.890 OTHER SPECIFIED POSTPROCEDURAL STATES: Chronic | ICD-10-CM

## 2024-08-09 DIAGNOSIS — K62.3 RECTAL PROLAPSE: ICD-10-CM

## 2024-08-09 DIAGNOSIS — Z01.818 ENCOUNTER FOR OTHER PREPROCEDURAL EXAMINATION: ICD-10-CM

## 2024-08-09 LAB
ALBUMIN SERPL ELPH-MCNC: 3.5 G/DL — SIGNIFICANT CHANGE UP (ref 3.3–5)
ALP SERPL-CCNC: 62 U/L — SIGNIFICANT CHANGE UP (ref 30–120)
ALT FLD-CCNC: 20 U/L — SIGNIFICANT CHANGE UP (ref 10–60)
ANION GAP SERPL CALC-SCNC: 6 MMOL/L — SIGNIFICANT CHANGE UP (ref 5–17)
AST SERPL-CCNC: 16 U/L — SIGNIFICANT CHANGE UP (ref 10–40)
BILIRUB SERPL-MCNC: 0.5 MG/DL — SIGNIFICANT CHANGE UP (ref 0.2–1.2)
BUN SERPL-MCNC: 14 MG/DL — SIGNIFICANT CHANGE UP (ref 7–23)
CALCIUM SERPL-MCNC: 9.4 MG/DL — SIGNIFICANT CHANGE UP (ref 8.4–10.5)
CHLORIDE SERPL-SCNC: 104 MMOL/L — SIGNIFICANT CHANGE UP (ref 96–108)
CO2 SERPL-SCNC: 30 MMOL/L — SIGNIFICANT CHANGE UP (ref 22–31)
CREAT SERPL-MCNC: 0.76 MG/DL — SIGNIFICANT CHANGE UP (ref 0.5–1.3)
EGFR: 90 ML/MIN/1.73M2 — SIGNIFICANT CHANGE UP
GLUCOSE SERPL-MCNC: 98 MG/DL — SIGNIFICANT CHANGE UP (ref 70–99)
HCT VFR BLD CALC: 40.7 % — SIGNIFICANT CHANGE UP (ref 34.5–45)
HGB BLD-MCNC: 13.1 G/DL — SIGNIFICANT CHANGE UP (ref 11.5–15.5)
MCHC RBC-ENTMCNC: 29.2 PG — SIGNIFICANT CHANGE UP (ref 27–34)
MCHC RBC-ENTMCNC: 32.2 GM/DL — SIGNIFICANT CHANGE UP (ref 32–36)
MCV RBC AUTO: 90.6 FL — SIGNIFICANT CHANGE UP (ref 80–100)
NRBC # BLD: 0 /100 WBCS — SIGNIFICANT CHANGE UP (ref 0–0)
PLATELET # BLD AUTO: 199 K/UL — SIGNIFICANT CHANGE UP (ref 150–400)
POTASSIUM SERPL-MCNC: 4.2 MMOL/L — SIGNIFICANT CHANGE UP (ref 3.5–5.3)
POTASSIUM SERPL-SCNC: 4.2 MMOL/L — SIGNIFICANT CHANGE UP (ref 3.5–5.3)
PROT SERPL-MCNC: 7 G/DL — SIGNIFICANT CHANGE UP (ref 6–8.3)
RBC # BLD: 4.49 M/UL — SIGNIFICANT CHANGE UP (ref 3.8–5.2)
RBC # FLD: 13.7 % — SIGNIFICANT CHANGE UP (ref 10.3–14.5)
SODIUM SERPL-SCNC: 140 MMOL/L — SIGNIFICANT CHANGE UP (ref 135–145)
WBC # BLD: 4.81 K/UL — SIGNIFICANT CHANGE UP (ref 3.8–10.5)
WBC # FLD AUTO: 4.81 K/UL — SIGNIFICANT CHANGE UP (ref 3.8–10.5)

## 2024-08-09 PROCEDURE — G0463: CPT

## 2024-08-09 PROCEDURE — 93005 ELECTROCARDIOGRAM TRACING: CPT

## 2024-08-09 PROCEDURE — 85027 COMPLETE CBC AUTOMATED: CPT

## 2024-08-09 PROCEDURE — 93010 ELECTROCARDIOGRAM REPORT: CPT

## 2024-08-09 PROCEDURE — 80053 COMPREHEN METABOLIC PANEL: CPT

## 2024-08-09 PROCEDURE — 36415 COLL VENOUS BLD VENIPUNCTURE: CPT

## 2024-08-09 NOTE — H&P PST ADULT - NSICDXPROCEDURE_GEN_ALL_CORE_FT
PROCEDURES:  Closed hemorrhoidectomy 09-Aug-2024 08:09:43 hemorrhoidectomy excision mucosal prolapse Lanie Ross

## 2024-08-09 NOTE — H&P PST ADULT - ASSESSMENT
60 y/o female present with fourth degree hemorrhoids rectal prolapse  scheduled surgery: Hemorrhoidectomy ,excision mucosal prolapse  will obtain medical clearance  pre-op instructions provided  Instructions provided on medications to continue and to take the day morning of surgery

## 2024-08-09 NOTE — H&P PST ADULT - NSICDXFAMILYHX_GEN_ALL_CORE_FT
FAMILY HISTORY:  Father  Still living? Unknown  Family history of renal cancer, Age at diagnosis: Age Unknown    Mother  Still living? No  FH: type 2 diabetes, Age at diagnosis: Age Unknown

## 2024-08-09 NOTE — H&P PST ADULT - RESPIRATORY
clear to auscultation bilaterally/no wheezes Prednisone Counseling:  I discussed with the patient the risks of prolonged use of prednisone including but not limited to weight gain, insomnia, osteoporosis, mood changes, diabetes, susceptibility to infection, glaucoma and high blood pressure.  In cases where prednisone use is prolonged, patients should be monitored with blood pressure checks, serum glucose levels and an eye exam.  Additionally, the patient may need to be placed on GI prophylaxis, PCP prophylaxis, and calcium and vitamin D supplementation and/or a bisphosphonate.  The patient verbalized understanding of the proper use and the possible adverse effects of prednisone.  All of the patient's questions and concerns were addressed.

## 2024-08-09 NOTE — H&P PST ADULT - NSANTHOSAYNRD_GEN_A_CORE
normal... No. GARRICK screening performed.  STOP BANG Legend: 0-2 = LOW Risk; 3-4 = INTERMEDIATE Risk; 5-8 = HIGH Risk

## 2024-08-09 NOTE — H&P PST ADULT - NSICDXPASTMEDICALHX_GEN_ALL_CORE_FT
PAST MEDICAL HISTORY:  Constipation     Hemorrhoid     Migraine     Rectal prolapse     Umbilical hernia

## 2024-08-09 NOTE — H&P PST ADULT - HISTORY OF PRESENT ILLNESS
60 y/o female with no pertinent PMH present with Hemorrhoid, rectal prolapse , patient complains constipation, bleeding and discomfort . Patient is scheduled for Hemorrhoidectomy excision mucosal prolapse on 08/15/2024

## 2024-08-12 ENCOUNTER — TRANSCRIPTION ENCOUNTER (OUTPATIENT)
Age: 59
End: 2024-08-12

## 2024-08-13 ENCOUNTER — TRANSCRIPTION ENCOUNTER (OUTPATIENT)
Age: 59
End: 2024-08-13

## 2024-08-15 RX ORDER — ELETRIPTAN HYDROBROMIDE 40 MG/1
0 TABLET, FILM COATED ORAL
Refills: 0 | DISCHARGE